# Patient Record
Sex: FEMALE | Race: WHITE | NOT HISPANIC OR LATINO | Employment: FULL TIME | URBAN - METROPOLITAN AREA
[De-identification: names, ages, dates, MRNs, and addresses within clinical notes are randomized per-mention and may not be internally consistent; named-entity substitution may affect disease eponyms.]

---

## 2023-06-13 LAB
CHOLESTEROL (MG/DL) IN SER/PLAS: 133 MG/DL (ref 0–199)
CHOLESTEROL IN HDL (MG/DL) IN SER/PLAS: 66.6 MG/DL
CHOLESTEROL/HDL RATIO: 2
LDL: 57 MG/DL (ref 0–99)
TRIGLYCERIDE (MG/DL) IN SER/PLAS: 46 MG/DL (ref 0–149)
VLDL: 9 MG/DL (ref 0–40)

## 2023-11-15 ENCOUNTER — TELEPHONE (OUTPATIENT)
Dept: NEUROLOGY | Facility: HOSPITAL | Age: 33
End: 2023-11-15
Payer: COMMERCIAL

## 2023-11-15 NOTE — TELEPHONE ENCOUNTER
Felicitas Costello, mom, left a message stating that yesterday Cece was experiencing dizziness for a few hours and something was going on with her eyes.  They took her to [facility] and was admitted to [facility].  CT's and MRI's were completed and Felicitas was wondering if Dr. Hagen would be willing to review those tests. Cece is scheduled to see Dr. Hagen in January.  Felicitas: [phone].

## 2023-11-17 NOTE — TELEPHONE ENCOUNTER
I spoke to patient's mother Felicitas. Cece was discharged from hospital with dx of Vertigo. All symptoms have resolved. Plan is to follow up with PCP for this. They already have a scheduled follow up with Dr. Hagen in January. Felicitas denies any other questions or concerns related to hospital stay. She will call us back with any future questions or concerns.

## 2024-01-12 PROBLEM — I26.99 PULMONARY EMBOLISM (MULTI): Status: ACTIVE | Noted: 2024-01-12

## 2024-01-12 PROBLEM — E28.2 PCOS (POLYCYSTIC OVARIAN SYNDROME): Status: ACTIVE | Noted: 2024-01-12

## 2024-01-12 PROBLEM — R41.82 ALTERED MENTAL STATUS: Status: ACTIVE | Noted: 2024-01-12

## 2024-01-12 PROBLEM — K04.7 DENTAL INFECTION: Status: RESOLVED | Noted: 2024-01-12 | Resolved: 2024-01-12

## 2024-01-12 PROBLEM — J03.90 ACUTE TONSILLITIS: Status: RESOLVED | Noted: 2024-01-12 | Resolved: 2024-01-12

## 2024-01-12 PROBLEM — I69.351 SPASTIC HEMIPLEGIA OF RIGHT DOMINANT SIDE AS LATE EFFECT OF CEREBRAL INFARCTION (MULTI): Status: ACTIVE | Noted: 2023-03-01

## 2024-01-12 PROBLEM — F41.9 ANXIETY: Status: ACTIVE | Noted: 2024-01-12

## 2024-01-12 PROBLEM — N94.10 DYSPAREUNIA IN FEMALE: Status: RESOLVED | Noted: 2024-01-12 | Resolved: 2024-01-12

## 2024-01-12 PROBLEM — K59.00 CONSTIPATION: Status: ACTIVE | Noted: 2024-01-12

## 2024-01-12 PROBLEM — I63.519: Status: ACTIVE | Noted: 2023-02-28

## 2024-01-12 PROBLEM — F98.8 ATTENTION DEFICIT DISORDER: Status: ACTIVE | Noted: 2024-01-12

## 2024-01-12 PROBLEM — J02.9 SORE THROAT: Status: RESOLVED | Noted: 2024-01-12 | Resolved: 2024-01-12

## 2024-01-12 PROBLEM — N20.0 KIDNEY STONE: Status: ACTIVE | Noted: 2024-01-12

## 2024-01-12 PROBLEM — R26.9 ABNORMALITY OF GAIT: Status: ACTIVE | Noted: 2023-03-28

## 2024-01-12 PROBLEM — E78.00 HYPERCHOLESTEREMIA: Status: ACTIVE | Noted: 2024-01-12

## 2024-01-12 PROBLEM — R87.810 CERVICAL HIGH RISK HPV (HUMAN PAPILLOMAVIRUS) TEST POSITIVE: Status: ACTIVE | Noted: 2024-01-12
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
## 2024-01-12 PROBLEM — Z86.711 PERSONAL HISTORY OF PULMONARY EMBOLISM: Status: ACTIVE | Noted: 2023-05-10

## 2024-01-12 PROBLEM — K58.9 IRRITABLE BOWEL SYNDROME: Status: ACTIVE | Noted: 2024-01-12

## 2024-01-12 PROBLEM — G89.18 POSTOPERATIVE PAIN: Status: RESOLVED | Noted: 2024-01-12 | Resolved: 2024-01-12

## 2024-01-12 PROBLEM — N92.6 IRREGULAR MENSTRUAL CYCLE: Status: ACTIVE | Noted: 2024-01-12

## 2024-01-12 PROBLEM — I63.9 CEREBROVASCULAR ACCIDENT (CVA) (MULTI): Status: ACTIVE | Noted: 2024-01-12

## 2024-01-12 PROBLEM — R10.84 ABDOMINAL CRAMPING, GENERALIZED: Status: RESOLVED | Noted: 2024-01-12 | Resolved: 2024-01-12

## 2024-01-12 PROBLEM — F41.8 MIXED ANXIETY AND DEPRESSIVE DISORDER: Status: ACTIVE | Noted: 2024-01-12

## 2024-01-12 PROBLEM — R07.9 CHEST PAIN: Status: ACTIVE | Noted: 2024-01-12

## 2024-01-12 PROBLEM — R47.01 APHASIA: Status: ACTIVE | Noted: 2023-03-28

## 2024-01-12 PROBLEM — R48.2 APRAXIA: Status: ACTIVE | Noted: 2023-03-28

## 2024-01-12 PROBLEM — R42 DIZZINESS: Status: ACTIVE | Noted: 2023-11-15

## 2024-01-12 PROBLEM — G47.09 OTHER INSOMNIA: Status: ACTIVE | Noted: 2024-01-12

## 2024-01-12 PROBLEM — L30.9 PERIORBITAL DERMATITIS: Status: ACTIVE | Noted: 2024-01-12

## 2024-01-12 RX ORDER — FLUOXETINE HYDROCHLORIDE 20 MG/1
1 CAPSULE ORAL
COMMUNITY
Start: 2023-02-28

## 2024-01-12 RX ORDER — ASPIRIN 81 MG/1
TABLET ORAL EVERY 24 HOURS
COMMUNITY

## 2024-01-12 RX ORDER — SPIRONOLACTONE 25 MG/1
12.5 TABLET ORAL
COMMUNITY
Start: 2023-04-24 | End: 2024-01-15 | Stop reason: WASHOUT

## 2024-01-12 RX ORDER — ATORVASTATIN CALCIUM 80 MG/1
1 TABLET, FILM COATED ORAL NIGHTLY
COMMUNITY
Start: 2023-02-28 | End: 2024-01-15 | Stop reason: DRUGHIGH

## 2024-01-12 RX ORDER — CHOLECALCIFEROL (VITAMIN D3) 25 MCG
1000 TABLET ORAL
COMMUNITY
Start: 2023-03-24

## 2024-01-12 RX ORDER — BACLOFEN 10 MG/1
10 TABLET ORAL 2 TIMES DAILY
COMMUNITY
Start: 2023-10-17

## 2024-01-12 RX ORDER — MELATONIN 5 MG
CAPSULE ORAL EVERY 24 HOURS
COMMUNITY

## 2024-01-15 ENCOUNTER — OFFICE VISIT (OUTPATIENT)
Dept: NEUROLOGY | Facility: CLINIC | Age: 34
End: 2024-01-15
Payer: COMMERCIAL

## 2024-01-15 VITALS
BODY MASS INDEX: 25.34 KG/M2 | DIASTOLIC BLOOD PRESSURE: 63 MMHG | RESPIRATION RATE: 18 BRPM | SYSTOLIC BLOOD PRESSURE: 105 MMHG | WEIGHT: 157 LBS | HEART RATE: 75 BPM

## 2024-01-15 DIAGNOSIS — I63.512 CEREBROVASCULAR ACCIDENT (CVA) DUE TO OCCLUSION OF LEFT MIDDLE CEREBRAL ARTERY (MULTI): ICD-10-CM

## 2024-01-15 DIAGNOSIS — R42 VERTIGO: ICD-10-CM

## 2024-01-15 DIAGNOSIS — I69.320 APHASIA AS LATE EFFECT OF STROKE: ICD-10-CM

## 2024-01-15 DIAGNOSIS — I69.351 SPASTIC HEMIPLEGIA OF RIGHT DOMINANT SIDE AS LATE EFFECT OF CEREBRAL INFARCTION (MULTI): Primary | ICD-10-CM

## 2024-01-15 DIAGNOSIS — E78.5 DYSLIPIDEMIA: ICD-10-CM

## 2024-01-15 PROCEDURE — 1036F TOBACCO NON-USER: CPT | Performed by: PSYCHIATRY & NEUROLOGY

## 2024-01-15 PROCEDURE — 99215 OFFICE O/P EST HI 40 MIN: CPT | Performed by: PSYCHIATRY & NEUROLOGY

## 2024-01-15 RX ORDER — SPIRONOLACTONE 50 MG/1
50 TABLET, FILM COATED ORAL DAILY
COMMUNITY
Start: 2024-01-09

## 2024-01-15 RX ORDER — ATORVASTATIN CALCIUM 40 MG/1
40 TABLET, FILM COATED ORAL NIGHTLY
Qty: 30 TABLET | Refills: 0 | Status: SHIPPED | OUTPATIENT
Start: 2024-01-15 | End: 2024-02-14

## 2024-01-15 ASSESSMENT — ACTIVITIES OF DAILY LIVING (ADL)
BLADDER: CONTINENT
FEEDING: INDEPENDENT
BED_TO_CHAIR_AND_BACK: INDEPENDENT
STAIRS: INDEPENDENT
GROOMING: INDEPENDENT FACE/HAIR/TEETH.SHAVING (IMPLEMENTS PROVIDED)
BATHING: INDEPENDENT (OR IN SHOWER)
TOILET_USE: INDEPENDENT (ON AND OFF, DRESSING, WIPING)
MOBILITY_LEVEL_SURFACES: INDEPENDENT (BUT MAY USE ANY AID FOR EXAMPLE, STICK) > 50 YARDS
DRESSING: INDEPENDENT (INCLUDING BUTTONS, ZIPS, LACES,ETC.)
TOTAL_SCORE: 100
BOWELS: INDEPENDENT (INCLUDING BUTTONS, ZIPS, LACES, ETC.)

## 2024-01-15 ASSESSMENT — PAIN SCALES - GENERAL: PAINLEVEL: 0-NO PAIN

## 2024-01-15 NOTE — PROGRESS NOTES
"   [facility]   Cece Costello is a 33 y.o. year old female presenting for follow-up .   PCP Durga Faustin,     2/18-28/23- presented to [facility] with mental status changes, dx with acute LMCA infarct, NIHSS 25. On arrival, NIHSS 18, not eligible for TNK or thrombectomy. Managed for cytotoxic edema with need for surgical decompression. Background prior hx of post-op DVt/PE, ADHD but hypercoag testing and LE US negative.   DC to acute rehab, NIHSS 15 on Asa/ statin.      5/19/23- Stroke prevention clinic- followup of hospitalization for stroke- here with mother. In outpatient therapies making improvements in speech, R hemiparesis. Frustrated with depressed mood, SSRI adjusted recently.   Vascular risk factors- HPL-  No HTN, no DM- A1c 4.8%, smoking status-nonsmoker.    8/25/23- Stroke prevention clinic followup-   Today: here with father, in outpatient therapies and daily activities with family support, brothers have added aphasia apps. She's concerned re: weight gain, one of her pleasures is going out to eat with family, walking in park. [place]. Most frustrated by inability to express herself > R hand weakness. One treatment of botox not effective, anticipating 2nd treatment with dose escalation.   In interval, saw Hematology, extensive testing sent, results thus far no identified hypercoagulable state.     1/15/24- Followup now ~1yr since stroke.  Residual aphasia- most frustrated by lack of \"functional words\" (shows me screenshot on her phone), and plegic RUE with grasp but inability to release.  Continues in therapies, in research study for speech and exploring research study options for UE therapy. Regular botox- she is not convinced of significant benefit.  Embraced diet changes, reverts to fast food during periods. Seeing psychiatrist, wants to restart adderall.  They visited [facility] stem cell therapy center who quoted ~3 IV sessions " for $57,000.    No new stroke events although since last visit briefly hospitalized for vertigo, similar to an episode 5yr prior., no new MRI findings.       Extensive review of notes in EMR, labs, tests, Interpretation of neuroimaging  2023- normal/ negative- Leiden V, PT gene mutation, lipo(a), aCl and B2GP, ATIII, protein C, RF, HIV, JUNITO panel, homocysteine, Hgb, [id number]. Tox only + for amphetamine (home medication).   EKG/telemetry, TTE- EF 60%, normal chambers and valves, no PFO. LE US- no DVT. Cardiac MRI- normal.   2/2023- CT- initially normal, repeated showed large LMCA infarct and hyperdense MCA sign. MRI DWI confirmed, MRA neck- and CTA neck- no plaque or evidence of dissection.  11/2023- CT, CTA- noted small posterior wall contour change without plaque Persistent LMCA/ARANZA occlusion. MRI DWI no acute changes with MCA encephalomalacia from remote infarct.     Relevant ROS, Problem list, Past Medical/ Surgical/ Family/ Social history- reviewed and pertinent details noted in history.     Objective     Visit Vitals  /63 (BP Location: Left arm, Patient Position: Sitting, BP Cuff Size: Adult)   Pulse 75   Resp 18   Wt 71.2 kg (157 lb)   BMI 25.34 kg/m²   Smoking Status Never   BSA 1.82 m²       Neuro Scores/ Scales:   Modified Carlos (mRS) Modified Totz Score: Moderate disability.  Requires some help, but able to walk unassisted.   Barthel Index  Feeding: independent  Bathing: independent (or in shower)  Grooming: independent face/hair/teeth.shaving (implements provided)  Dressing : independent (including buttons, zips, laces,etc.)  Bowels: independent (including buttons, zips, laces, etc.)  Bladder: continent  Toilet Use : independent (on and off, dressing, wiping)  Transfers (Bed to chair and back) : independent  Mobility (On level surfaces): independent (but may use any aid for example, stick) > 50 yards  Stairs: independent  Total (0-100): 100   NIHSS NIH Stroke Scale 1A. Level of Consciousness:  Alert, Keenly Responsive, 1B. Ask Month and Age: Both Questions Right, 1C. Blink Eyes & Squeeze Hands: Performs 1 Task, 2. Best Gaze: Normal, 3. Visual: No Visual Loss, 4. Facial Palsy: Minor Paralysis, 5A. Motor - Left Arm: No Drift, 5B. Motor - Right Arm: Some Effort Against Gravity, 6A. Motor - Left Leg: No Drift, 6B. Motor - Right Leg: Drift, 7. Limb Ataxia: Absent, 8. Sensory Loss: Normal, 9. Best Language: Severe Aphasia, 10. Dysarthria: Normal, 11. Extinction and Inattention: No Abnormality, NIH Stroke Scale: 7   Can passively reduce R fingers which are fisted in primary position.  Aphasia with nonfluent, limited vocabulary with >50% comprehension.     Assessment/Plan   1. Spastic hemiplegia of right dominant side as late effect of cerebral infarction (CMS/HCC)    2. Cerebrovascular accident (CVA) due to occlusion of left middle cerebral artery (CMS/HCC)    3. Aphasia as late effect of stroke    4. Dyslipidemia    5. Vertigo      LMCA infarct with LMCA occlusion, cryptogenic (2/2023)  with extensive workup.  Repeat imaging 11/2023- small contour change cervical LICA without plaque or stenosis, radiologist queried a web, this was previously interpreted as flow changes due to distal occlusion.    Plan: continue aspirin for stroke prevention.   Late effects- aphasia, spastic R hemiparesis improved significantly from initial NIHSS 25.  Continue with therapy.   Vascular risk factors- only HPL- LDL 57 (down from 181) Plan: continue statin but reduce atorvastatin to 40mg/d, recheck lipids in ~8wk, continue healthy diet to goal LDL <70  Discussed her interest in restarting adderall through her psychiatrist.  Counseled re: risk of high blood pressure and need for monitoring by PCP, rick when combined with stimulatory antidepressants such as fluoxetine,.  Recommended clear metrics be defined to evaluate its benefit > risk.      No orders of the defined types were placed in this encounter.

## 2024-01-29 ENCOUNTER — APPOINTMENT (OUTPATIENT)
Dept: NEUROLOGY | Facility: CLINIC | Age: 34
End: 2024-01-29
Payer: COMMERCIAL

## 2024-03-08 ENCOUNTER — TELEPHONE (OUTPATIENT)
Dept: NEUROLOGY | Facility: CLINIC | Age: 34
End: 2024-03-08
Payer: COMMERCIAL

## 2024-03-08 NOTE — TELEPHONE ENCOUNTER
Cece's mother states she was seen a couple of months ago and was told to call if she couldn't find her hand brace.  She states she is unable to find and is requesting an RX for a new brace.    Felicitas: [phone]

## 2024-03-11 DIAGNOSIS — I69.351 SPASTIC HEMIPLEGIA OF RIGHT DOMINANT SIDE AS LATE EFFECT OF CEREBRAL INFARCTION (MULTI): Primary | ICD-10-CM

## 2024-03-11 NOTE — TELEPHONE ENCOUNTER
Spoke to patients mother, order placed for orthotic and emailed to patient's father at [email or web address]

## 2024-10-30 ENCOUNTER — OFFICE VISIT (OUTPATIENT)
Dept: OBSTETRICS AND GYNECOLOGY | Facility: CLINIC | Age: 34
End: 2024-10-30
Payer: COMMERCIAL

## 2024-10-30 VITALS
BODY MASS INDEX: 24.59 KG/M2 | DIASTOLIC BLOOD PRESSURE: 82 MMHG | HEIGHT: 66 IN | SYSTOLIC BLOOD PRESSURE: 121 MMHG | WEIGHT: 153 LBS

## 2024-10-30 DIAGNOSIS — Z71.85 HPV VACCINE COUNSELING: ICD-10-CM

## 2024-10-30 DIAGNOSIS — L68.0 HIRSUTISM: ICD-10-CM

## 2024-10-30 DIAGNOSIS — Z13.29 SCREENING FOR ENDOCRINE DISORDER: ICD-10-CM

## 2024-10-30 DIAGNOSIS — N89.8 VAGINAL ODOR: ICD-10-CM

## 2024-10-30 DIAGNOSIS — Z30.09 BIRTH CONTROL COUNSELING: ICD-10-CM

## 2024-10-30 DIAGNOSIS — N94.6 DYSMENORRHEA: ICD-10-CM

## 2024-10-30 DIAGNOSIS — Z12.4 CERVICAL CANCER SCREENING: ICD-10-CM

## 2024-10-30 DIAGNOSIS — Z01.411 ENCOUNTER FOR GYNECOLOGICAL EXAMINATION (GENERAL) (ROUTINE) WITH ABNORMAL FINDINGS: Primary | ICD-10-CM

## 2024-10-30 DIAGNOSIS — N89.8 VAGINAL LEUKORRHEA: ICD-10-CM

## 2024-10-30 DIAGNOSIS — Z31.69 ENCOUNTER FOR PRECONCEPTION CONSULTATION: ICD-10-CM

## 2024-10-30 DIAGNOSIS — Z82.62 FAMILY HISTORY OF OSTEOPOROSIS: ICD-10-CM

## 2024-10-30 PROCEDURE — 99395 PREV VISIT EST AGE 18-39: CPT | Performed by: OBSTETRICS & GYNECOLOGY

## 2024-10-30 PROCEDURE — 3008F BODY MASS INDEX DOCD: CPT | Performed by: OBSTETRICS & GYNECOLOGY

## 2024-10-30 RX ORDER — IBUPROFEN 800 MG/1
800 TABLET ORAL EVERY 8 HOURS PRN
Qty: 90 TABLET | Refills: 1 | Status: SHIPPED | OUTPATIENT
Start: 2024-10-30 | End: 2024-10-30

## 2024-10-30 ASSESSMENT — LIFESTYLE VARIABLES
HOW OFTEN DO YOU HAVE A DRINK CONTAINING ALCOHOL: MONTHLY OR LESS
HOW MANY STANDARD DRINKS CONTAINING ALCOHOL DO YOU HAVE ON A TYPICAL DAY: 1 OR 2
SKIP TO QUESTIONS 9-10: 1
HOW OFTEN DO YOU HAVE SIX OR MORE DRINKS ON ONE OCCASION: NEVER
AUDIT-C TOTAL SCORE: 1

## 2024-10-30 ASSESSMENT — ENCOUNTER SYMPTOMS
LOSS OF SENSATION IN FEET: 0
OCCASIONAL FEELINGS OF UNSTEADINESS: 0
DEPRESSION: 0

## 2024-10-30 ASSESSMENT — PATIENT HEALTH QUESTIONNAIRE - PHQ9
SUM OF ALL RESPONSES TO PHQ9 QUESTIONS 1 & 2: 0
2. FEELING DOWN, DEPRESSED OR HOPELESS: NOT AT ALL
1. LITTLE INTEREST OR PLEASURE IN DOING THINGS: NOT AT ALL

## 2024-11-03 DIAGNOSIS — N89.8 VAGINAL ODOR: Primary | ICD-10-CM

## 2024-11-03 DIAGNOSIS — N89.8 VAGINAL LEUKORRHEA: ICD-10-CM

## 2024-11-04 RX ORDER — METRONIDAZOLE 500 MG/1
500 TABLET ORAL EVERY 12 HOURS
Qty: 14 TABLET | Refills: 0 | Status: SHIPPED | OUTPATIENT
Start: 2024-11-04 | End: 2024-11-11

## 2024-11-07 ENCOUNTER — TELEPHONE (OUTPATIENT)
Dept: OBSTETRICS AND GYNECOLOGY | Facility: CLINIC | Age: 34
End: 2024-11-07
Payer: COMMERCIAL

## 2024-11-07 ENCOUNTER — PATIENT MESSAGE (OUTPATIENT)
Dept: OBSTETRICS AND GYNECOLOGY | Facility: CLINIC | Age: 34
End: 2024-11-07
Payer: COMMERCIAL

## 2024-11-11 NOTE — PATIENT COMMUNICATION
Spoke to Pts Mother today. She was informed. She also changed follow up  visit to Dec because she will be out of town the 19th of Nov and doesn't want her Dad to bring her.

## 2024-11-19 ENCOUNTER — APPOINTMENT (OUTPATIENT)
Dept: OBSTETRICS AND GYNECOLOGY | Facility: CLINIC | Age: 34
End: 2024-11-19
Payer: COMMERCIAL

## 2024-12-16 ENCOUNTER — APPOINTMENT (OUTPATIENT)
Dept: OBSTETRICS AND GYNECOLOGY | Facility: CLINIC | Age: 34
End: 2024-12-16
Payer: COMMERCIAL

## 2025-01-14 ENCOUNTER — ANCILLARY PROCEDURE (OUTPATIENT)
Dept: URGENT CARE | Age: 35
End: 2025-01-14
Payer: COMMERCIAL

## 2025-01-14 ENCOUNTER — OFFICE VISIT (OUTPATIENT)
Dept: URGENT CARE | Age: 35
End: 2025-01-14
Payer: COMMERCIAL

## 2025-01-14 VITALS
BODY MASS INDEX: 23.89 KG/M2 | OXYGEN SATURATION: 98 % | TEMPERATURE: 97.7 F | HEART RATE: 103 BPM | WEIGHT: 148 LBS | RESPIRATION RATE: 18 BRPM | DIASTOLIC BLOOD PRESSURE: 77 MMHG | SYSTOLIC BLOOD PRESSURE: 117 MMHG

## 2025-01-14 DIAGNOSIS — B96.89 ACUTE BACTERIAL BRONCHITIS: Primary | ICD-10-CM

## 2025-01-14 DIAGNOSIS — J20.8 ACUTE BACTERIAL BRONCHITIS: Primary | ICD-10-CM

## 2025-01-14 PROCEDURE — 99203 OFFICE O/P NEW LOW 30 MIN: CPT | Performed by: SURGERY

## 2025-01-14 PROCEDURE — 71046 X-RAY EXAM CHEST 2 VIEWS: CPT | Performed by: SURGERY

## 2025-01-14 PROCEDURE — 1036F TOBACCO NON-USER: CPT | Performed by: SURGERY

## 2025-01-14 RX ORDER — AZITHROMYCIN 250 MG/1
TABLET, FILM COATED ORAL
Qty: 6 TABLET | Refills: 0 | Status: SHIPPED | OUTPATIENT
Start: 2025-01-14 | End: 2025-01-19

## 2025-01-14 RX ORDER — PROMETHAZINE HYDROCHLORIDE AND DEXTROMETHORPHAN HYDROBROMIDE 6.25; 15 MG/5ML; MG/5ML
SYRUP ORAL
Qty: 80 ML | Refills: 0 | Status: SHIPPED | OUTPATIENT
Start: 2025-01-14

## 2025-01-14 NOTE — PROGRESS NOTES
Chief Complaint   Patient presents with    Cough    Sinus Problem    Headache      14 DAYS SICK / COUGH GETTING WORSE        Physical Exam:     GEN: No acute distress    ENT: Bilateral TMs and canals unremarkable, sinus congestion present. Pharynx and tonsils mildly hyperemic but without exudate.     Resp: Lungs clear to auscultation bilaterally     Imaging:       === 01/14/25 ===    XR CHEST 2 VIEWS    - Impression -  No focal infiltrate or pneumothorax.    MACRO:  None.    Signed by: Luis Paulino 1/14/2025 11:36 AM  Dictation workstation:   [id number]    Assessment:     Encounter Diagnosis   Name Primary?    Acute bacterial bronchitis Yes          Medical Decision Making & Plan:     Azithromycin   Promethazine DM        01/14/25 at 2:06 PM - Luz Toussaint, DO

## 2025-02-03 ENCOUNTER — TELEPHONE (OUTPATIENT)
Dept: OBSTETRICS AND GYNECOLOGY | Facility: CLINIC | Age: 35
End: 2025-02-03
Payer: COMMERCIAL

## 2025-02-03 NOTE — TELEPHONE ENCOUNTER
"Pt's mom \"Felicitas\" will have a discussion with Cece regarding Mirena and if interested, Felicitas will contact the office if pt wants to move forward with mirena device.     Please verify that insurance is current.     Pt has a mirena consent form signed and on hold in media.   "